# Patient Record
Sex: FEMALE | Race: ASIAN
[De-identification: names, ages, dates, MRNs, and addresses within clinical notes are randomized per-mention and may not be internally consistent; named-entity substitution may affect disease eponyms.]

---

## 2023-09-20 ENCOUNTER — NURSE TRIAGE (OUTPATIENT)
Dept: OTHER | Facility: CLINIC | Age: 18
End: 2023-09-20

## 2023-09-20 NOTE — TELEPHONE ENCOUNTER
Location of patient: [address] [facility] [place] call from [facility] at [facility]; Patient with [employer] Complaint requesting to establish care with. Subjective: Caller states \"\"     Current Symptoms: seen by therapist, can't focus mood swings depression and anxiety. Therapist recommends follow up with PCP    Denies thoughts harming self or anyone else    Onset: ongoing    Associated Symptoms:     Pain Severity: 0/10;;     Temperature: denies fevers     What has been tried: therapist    LMP:  Birth control  Pregnant: No    Recommended disposition: See PCP within 3 Days. UCC/ED if unable to be seen    Care advice provided, patient verbalizes understanding; denies any other questions or concerns; instructed to call back for any new or worsening symptoms. Patient/Caller agrees with recommended disposition; writer provided warm transfer to [facility] for appointment scheduling    Attention Provider: Thank you for allowing me to participate in the care of your patient. The patient was connected to triage in response to information provided to the [facility]. Please do not respond through this encounter as the response is not directed to a shared pool.       Reason for Disposition   Patient wants to be seen    Protocols used: Depression-ADULT-[place]